# Patient Record
Sex: MALE | Race: AMERICAN INDIAN OR ALASKA NATIVE | NOT HISPANIC OR LATINO | Employment: FULL TIME | URBAN - METROPOLITAN AREA
[De-identification: names, ages, dates, MRNs, and addresses within clinical notes are randomized per-mention and may not be internally consistent; named-entity substitution may affect disease eponyms.]

---

## 2019-10-16 ENCOUNTER — OFFICE VISIT (OUTPATIENT)
Dept: FAMILY MEDICINE | Facility: OTHER | Age: 54
End: 2019-10-16
Payer: COMMERCIAL

## 2019-10-16 ENCOUNTER — ANCILLARY PROCEDURE (OUTPATIENT)
Dept: GENERAL RADIOLOGY | Facility: OTHER | Age: 54
End: 2019-10-16
Attending: PHYSICIAN ASSISTANT
Payer: COMMERCIAL

## 2019-10-16 VITALS
TEMPERATURE: 98.2 F | HEIGHT: 70 IN | HEART RATE: 84 BPM | WEIGHT: 207.8 LBS | DIASTOLIC BLOOD PRESSURE: 86 MMHG | OXYGEN SATURATION: 98 % | BODY MASS INDEX: 29.75 KG/M2 | RESPIRATION RATE: 16 BRPM | SYSTOLIC BLOOD PRESSURE: 158 MMHG

## 2019-10-16 DIAGNOSIS — M54.50 ACUTE BILATERAL LOW BACK PAIN WITHOUT SCIATICA: ICD-10-CM

## 2019-10-16 DIAGNOSIS — M54.2 CERVICAL PAIN (NECK): ICD-10-CM
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
## 2019-10-16 DIAGNOSIS — R03.0 ELEVATED BLOOD PRESSURE READING WITHOUT DIAGNOSIS OF HYPERTENSION: ICD-10-CM

## 2019-10-16 PROCEDURE — 72040 X-RAY EXAM NECK SPINE 2-3 VW: CPT

## 2019-10-16 PROCEDURE — 72100 X-RAY EXAM L-S SPINE 2/3 VWS: CPT

## 2019-10-16 PROCEDURE — 99204 OFFICE O/P NEW MOD 45 MIN: CPT | Performed by: PHYSICIAN ASSISTANT

## 2019-10-16 RX ORDER — CYCLOBENZAPRINE HCL 10 MG
10 TABLET ORAL 3 TIMES DAILY PRN
Qty: 30 TABLET | Refills: 1 | Status: SHIPPED | OUTPATIENT
Start: 2019-10-16 | End: 2019-12-11

## 2019-10-16 ASSESSMENT — MIFFLIN-ST. JEOR: SCORE: 1793.82
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
## 2019-10-16 NOTE — LETTER
[address]   [facility] [address]  Phone: [phone]    October 16, 2019        Alex Conklin  [address] [facility] [address]          To whom it may concern:    RE: Alex Conklin    Patient was seen and treated today at our clinic.  Please excuse from work 10/16/19 - 10/18/19    Please contact me for questions or concerns.      Sincerely,        Vishal Heller PA-C

## 2019-10-16 NOTE — PATIENT INSTRUCTIONS
Schedule with physical therapy to be seen.   Cyclobenzaprine take 1 tablet up to 3 times per day as needed but no driving or operating machinery on medication.   Tylenol/ibuprofen as needed for pain  See below for headache and concussion management.   Off work for Thursday and Friday.     Follow-up if not improving in 1 week, sooner if worse or new concerns.     RED FLAGS NEEDING ACUTE EMERGENCY MANAGEMENT:    Headaches that worsen    Seizures    Focal Neurologic Signs    Looks very drowsy/can't be awakened    Repeated vomiting    Slurred Speech    Can't recognize people/places    Increasing confusion/irritability    Weakness/numbness     Neck pain    Unusual behavioral change    Change in level of consciousness      What is a concussion? -- A concussion is a mild brain injury that commonly causes confusion, memory loss, and headache. Sometimes people pass out (lose consciousness) when they have a concussion, but not always.  A concussion can happen after a person is hit on the head, face, or neck, or their head or upper body is shaken too hard. Sports injuries, car accidents, and falls are common causes of concussions.  What are the symptoms of a concussion? -- Symptoms that can happen minutes to hours after a concussion include:  ?Memory loss - People sometimes forget what caused their injury, as well as what happened right before and after the injury.  ?Confusion  ?Headache  ?Dizziness or trouble with balance  ?Nausea or vomiting  ?Feeling sleepy  ?Acting cranky, strangely, or out of sorts     Symptoms that can happen hours to days after a concussion include:  ?Trouble walking or talking  ?Memory problems or problems paying attention  ?Trouble sleeping  ?Mood or behavior changes  ?Vision changes  ?Being bothered by noise or light    How is a concussion treated?  A concussion does not usually need treatment. Most concussions get better on their own, but it can take time. Some people's symptoms go away within  "minutes to hours. Other people have symptoms for weeks to months. When symptoms last a long time, doctors call it \"postconcussion syndrome.\"    To help your brain heal after a concussion, you can:   Very important to adhere to the following strictly for the first 3-5 days for best results.  And recommend that you be off work or school during that time.    ?Rest your body - Make sure to get plenty of sleep. When you are awake, you should avoid heavy exercise or too much physical activity.  ?Rest your brain - Avoid doing activities that need concentration or a lot of attention.  ?Not drink alcohol while you are still having symptoms of concussion  ?Take a pain-relieving medicine, if you have a headache - You can choose one with acetaminophen (sample brand name: Tylenol) or ibuprofen (sample brand names: Advil, Motrin).      (Information provided from Up To Date Concussion Patient Education Source)          "
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
## 2019-10-23 ENCOUNTER — OFFICE VISIT (OUTPATIENT)
Dept: FAMILY MEDICINE | Facility: OTHER | Age: 54
End: 2019-10-23
Payer: COMMERCIAL

## 2019-10-23 VITALS
OXYGEN SATURATION: 96 % | SYSTOLIC BLOOD PRESSURE: 130 MMHG | HEART RATE: 90 BPM | TEMPERATURE: 98.5 F | BODY MASS INDEX: 28.77 KG/M2 | DIASTOLIC BLOOD PRESSURE: 76 MMHG | HEIGHT: 70 IN | RESPIRATION RATE: 16 BRPM | WEIGHT: 201 LBS
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
## 2019-10-23 DIAGNOSIS — M54.50 ACUTE BILATERAL LOW BACK PAIN WITHOUT SCIATICA: ICD-10-CM

## 2019-10-23 DIAGNOSIS — G44.311 INTRACTABLE ACUTE POST-TRAUMATIC HEADACHE: ICD-10-CM

## 2019-10-23 DIAGNOSIS — M54.2 CERVICAL PAIN (NECK): ICD-10-CM

## 2019-10-23 PROCEDURE — 99214 OFFICE O/P EST MOD 30 MIN: CPT | Performed by: PHYSICIAN ASSISTANT

## 2019-10-23 ASSESSMENT — MIFFLIN-ST. JEOR: SCORE: 1760.04

## 2019-10-23 ASSESSMENT — PAIN SCALES - GENERAL: PAINLEVEL: EXTREME PAIN (8)

## 2019-10-23 NOTE — LETTER
[facility]  [address]   [facility] [address]  Phone: [phone]    October 23, 2019        Alex Conklin  [address]  [facility] [address]          To whom it may concern:    RE: Alex Conklin    Patient was seen and treated today at our clinic.  Concern for potential concussion.   Would recommend patient have off 10/23/2019 - 10/28/2019. If symptoms have improved ok to return to work on evening 10/28/2019 with restrictions for half shifts only for 3 days and if improving can return to regular shifts after this.  If not improving we will follow-up with patient.     Please contact me for questions or concerns.      Sincerely,        Vishal Heller PA-C

## 2019-10-23 NOTE — PATIENT INSTRUCTIONS
Headache:  - Get MRI done, if having vision changes, severe worsening headache go immediately to the ER.   - Ibuprofen 600-800 mg every 6-8 hours with food.   - Tylenol 500 mg every 4-6 hours additionally if needed.   - Cyclobenzaprine up to 3 times per day as needed but no driving on medication.   - Recommend seeing physical therapy. [phone].  - sleep and brain rest!  - if persistent headaches would like you to see Dr. Webb for concussion management.     Neck/Back:  - Schedule with physical therapy  - continue with plan discussed above.
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
## 2019-10-25 ENCOUNTER — HOSPITAL ENCOUNTER (OUTPATIENT)
Dept: MRI IMAGING | Facility: CLINIC | Age: 54
Discharge: HOME | End: 2019-10-25
Attending: PHYSICIAN ASSISTANT | Admitting: PHYSICIAN ASSISTANT
Payer: COMMERCIAL

## 2019-10-25 ENCOUNTER — HOSPITAL ENCOUNTER (OUTPATIENT)
Dept: GENERAL RADIOLOGY | Facility: CLINIC | Age: 54
End: 2019-10-25
Attending: PHYSICIAN ASSISTANT
Payer: COMMERCIAL

## 2019-10-25 DIAGNOSIS — G44.311 INTRACTABLE ACUTE POST-TRAUMATIC HEADACHE: ICD-10-CM

## 2019-10-25 PROCEDURE — 70551 MRI BRAIN STEM W/O DYE: CPT

## 2019-10-25 PROCEDURE — 70030 X-RAY EYE FOR FOREIGN BODY: CPT | Mod: TC

## 2019-10-30 ENCOUNTER — OFFICE VISIT (OUTPATIENT)
Dept: FAMILY MEDICINE | Facility: OTHER | Age: 54
End: 2019-10-30
Payer: COMMERCIAL

## 2019-10-30 VITALS
WEIGHT: 202 LBS | HEIGHT: 69 IN | OXYGEN SATURATION: 97 % | HEART RATE: 80 BPM | BODY MASS INDEX: 29.92 KG/M2 | RESPIRATION RATE: 14 BRPM | SYSTOLIC BLOOD PRESSURE: 166 MMHG | DIASTOLIC BLOOD PRESSURE: 104 MMHG | TEMPERATURE: 97.6 F

## 2019-10-30 DIAGNOSIS — G44.311 INTRACTABLE ACUTE POST-TRAUMATIC HEADACHE: Primary | ICD-10-CM

## 2019-10-30 PROCEDURE — 99213 OFFICE O/P EST LOW 20 MIN: CPT | Performed by: PHYSICIAN ASSISTANT

## 2019-10-30 RX ORDER — KETOROLAC TROMETHAMINE 10 MG/1
20 TABLET, FILM COATED ORAL ONCE
Qty: 4 TABLET | Refills: 0 | Status: SHIPPED | OUTPATIENT
Start: 2019-10-30 | End: 2019-12-11

## 2019-10-30 RX ORDER — AMITRIPTYLINE HYDROCHLORIDE 10 MG/1
10 TABLET ORAL AT BEDTIME
Qty: 30 TABLET | Refills: 0 | Status: SHIPPED | OUTPATIENT
Start: 2019-10-30 | End: 2019-11-06 | Stop reason: SINTOL

## 2019-10-30 RX ORDER — IBUPROFEN 600 MG/1
600 TABLET, FILM COATED ORAL EVERY 6 HOURS PRN
COMMUNITY
End: 2023-03-21

## 2019-10-30 ASSESSMENT — MIFFLIN-ST. JEOR: SCORE: 1761.26

## 2019-10-30 NOTE — LETTER
[facility]  [address]   [place] [address]  Phone: [phone]    October 30, 2019        Alex Conklin  [address]  [place] [address]          To whom it may concern:    RE: Alex Conklin    Patient was seen and treated today at our clinic.  We are assessing him and he is to be off until 100% per work.  He is not yet back to 100%.  We will notify as soon as possible when he is and when he can return to work.     Please contact me for questions or concerns.      Sincerely,        Vishal Heller PA-C

## 2019-10-30 NOTE — PATIENT INSTRUCTIONS
- Set up with Physical Therapy.   - Take the Toradol today, take with food in stomach.  Take 2 tablets once today. If needed can repeat again tomorrow.  No ibuprofen today or tomorrow if you're taking Toradol.   - Tylenol 500 mg every 4-6 hours if needed.   - Ice/heat on the neck. Neck massage.   - Cyclobenzaprine continue  - Amitriptyline take 1 tablet at bedtime, if tolerating can increase to 2 tablets after 3-4 days.   - drink lots of water, lots of rest.   - Avoid caffeine.     Follow-up 1 week for re-check.   Sooner if any concerns.

## 2019-10-31 ENCOUNTER — THERAPY VISIT (OUTPATIENT)
Dept: PHYSICAL THERAPY | Facility: CLINIC | Age: 54
End: 2019-10-31
Payer: COMMERCIAL

## 2019-10-31 DIAGNOSIS — M54.2 CERVICAL PAIN (NECK): ICD-10-CM

## 2019-10-31 DIAGNOSIS — M54.50 ACUTE BILATERAL LOW BACK PAIN WITHOUT SCIATICA: ICD-10-CM
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
## 2019-10-31 DIAGNOSIS — M54.2 CERVICAL PAIN: ICD-10-CM

## 2019-10-31 DIAGNOSIS — G44.209 TENSION HEADACHE: ICD-10-CM

## 2019-10-31 PROCEDURE — 97161 PT EVAL LOW COMPLEX 20 MIN: CPT | Mod: GP | Performed by: PHYSICAL THERAPIST

## 2019-10-31 PROCEDURE — 97140 MANUAL THERAPY 1/> REGIONS: CPT | Mod: GP | Performed by: PHYSICAL THERAPIST

## 2019-10-31 PROCEDURE — 97110 THERAPEUTIC EXERCISES: CPT | Mod: GP | Performed by: PHYSICAL THERAPIST
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
## 2019-11-05 ENCOUNTER — THERAPY VISIT (OUTPATIENT)
Dept: PHYSICAL THERAPY | Facility: CLINIC | Age: 54
End: 2019-11-05
Payer: COMMERCIAL

## 2019-11-05 DIAGNOSIS — G44.209 TENSION HEADACHE: ICD-10-CM

## 2019-11-05 DIAGNOSIS — M54.2 CERVICAL PAIN: ICD-10-CM

## 2019-11-05 PROCEDURE — 97140 MANUAL THERAPY 1/> REGIONS: CPT | Mod: GP | Performed by: PHYSICAL THERAPIST

## 2019-11-05 PROCEDURE — 97110 THERAPEUTIC EXERCISES: CPT | Mod: GP | Performed by: PHYSICAL THERAPIST

## 2019-11-06 ENCOUNTER — OFFICE VISIT (OUTPATIENT)
Dept: FAMILY MEDICINE | Facility: OTHER | Age: 54
End: 2019-11-06
Payer: COMMERCIAL

## 2019-11-06 VITALS
WEIGHT: 198.2 LBS | SYSTOLIC BLOOD PRESSURE: 144 MMHG | BODY MASS INDEX: 29.02 KG/M2 | RESPIRATION RATE: 16 BRPM | HEART RATE: 82 BPM | OXYGEN SATURATION: 99 % | DIASTOLIC BLOOD PRESSURE: 88 MMHG | TEMPERATURE: 97.3 F
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
## 2019-11-06 DIAGNOSIS — M54.50 ACUTE BILATERAL LOW BACK PAIN WITHOUT SCIATICA: ICD-10-CM

## 2019-11-06 DIAGNOSIS — G44.311 INTRACTABLE ACUTE POST-TRAUMATIC HEADACHE: Primary | ICD-10-CM

## 2019-11-06 DIAGNOSIS — M54.2 CERVICAL PAIN (NECK): ICD-10-CM

## 2019-11-06 PROCEDURE — 99213 OFFICE O/P EST LOW 20 MIN: CPT | Performed by: PHYSICIAN ASSISTANT

## 2019-11-06 RX ORDER — PROPRANOLOL HYDROCHLORIDE 20 MG/1
20 TABLET ORAL DAILY
Qty: 60 TABLET | Refills: 0 | Status: SHIPPED | OUTPATIENT
Start: 2019-11-06 | End: 2023-03-21

## 2019-11-06 ASSESSMENT — PAIN SCALES - GENERAL: PAINLEVEL: MODERATE PAIN (5)

## 2019-11-06 NOTE — PATIENT INSTRUCTIONS
Stop taking Amitriptyline  Ok to use cyclobenzaprine if needed.   Start Propranolol, take once daily.     See Neurology.  If having problems with the propranolol please let me know in the meantime, if it is helping let me know as well.

## 2019-11-12 ENCOUNTER — THERAPY VISIT (OUTPATIENT)
Dept: PHYSICAL THERAPY | Facility: CLINIC | Age: 54
End: 2019-11-12
Payer: COMMERCIAL

## 2019-11-12 ENCOUNTER — TELEPHONE (OUTPATIENT)
Dept: FAMILY MEDICINE | Facility: OTHER | Age: 54
End: 2019-11-12

## 2019-11-12 DIAGNOSIS — G44.209 TENSION HEADACHE: ICD-10-CM

## 2019-11-12 DIAGNOSIS — M54.2 CERVICAL PAIN: ICD-10-CM

## 2019-11-12 PROCEDURE — 97110 THERAPEUTIC EXERCISES: CPT | Mod: GP | Performed by: PHYSICAL THERAPIST

## 2019-11-12 PROCEDURE — 97140 MANUAL THERAPY 1/> REGIONS: CPT | Mod: GP | Performed by: PHYSICAL THERAPIST

## 2019-11-19 ENCOUNTER — THERAPY VISIT (OUTPATIENT)
Dept: PHYSICAL THERAPY | Facility: CLINIC | Age: 54
End: 2019-11-19
Payer: COMMERCIAL

## 2019-11-19 DIAGNOSIS — G44.209 TENSION HEADACHE: ICD-10-CM

## 2019-11-19 DIAGNOSIS — M54.2 CERVICAL PAIN: ICD-10-CM

## 2019-11-19 PROCEDURE — 97110 THERAPEUTIC EXERCISES: CPT | Mod: GP | Performed by: PHYSICAL THERAPIST

## 2019-11-19 PROCEDURE — 97140 MANUAL THERAPY 1/> REGIONS: CPT | Mod: GP | Performed by: PHYSICAL THERAPIST

## 2019-11-19 NOTE — PROGRESS NOTES
Subjective:  HPI                    Objective:  System    Physical Exam    General     ROS    Assessment/Plan:    DISCHARGE REPORT    Progress reporting period is from 10/31/19 to 11/19/19.       SUBJECTIVE  Subjective changes noted by patient:  .  Subjective: Pt reports the neck is not pain, mild pain remains. Primary pain with thiking. Pain today rated at 1-2/10 level. Pain remains in the suboccipitals. Headaches have been better, 1-2/10 level pain, getting up to 1 per day.     Current pain level is 1/10 Current Pain level: (1-2/10).     Previous pain level was  2/10 Initial Pain level: (5-11/10).   Changes in function:  Yes (See Goal flowsheet attached for changes in current functional level)  Adverse reaction to treatment or activity: None    OBJECTIVE  Changes noted in objective findings:    Objective: ROM flexion chin to chest no pain, extension to 73 deg, no pain, SB R to 33, no pain, L to 27, no pain, rotation R and L to 75 degrees. HEP going well no issues. Headaches are almost gone, pain mild at 1-2/10 when he gets them. Pt has met LTG, desires to continue on own. Will discharge PT     ASSESSMENT/PLAN  Updated problem list and treatment plan: Diagnosis 1:  Cervical pain   Pain -  home program  STG/LTGs have been met or progress has been made towards goals:  Yes (See Goal flow sheet completed today.)  Assessment of Progress: The patient has met all of their long term goals.  Self Management Plans:  Patient has been instructed in a home treatment program.  Patient is independent in a home treatment program.  Patient  has been instructed in self management of symptoms.  Patient is independent in self management of symptoms.    Alex continues to require the following intervention to meet STG and LTG's:  PT intervention is no longer required to meet STG/LTG.    Recommendations:  This patient is ready to be discharged from therapy and continue their home treatment program.    Please refer to the daily  flowsheet for treatment today, total treatment time and time spent performing 1:1 timed codes.

## 2019-11-27 DIAGNOSIS — G44.311 INTRACTABLE ACUTE POST-TRAUMATIC HEADACHE: ICD-10-CM

## 2019-11-27 NOTE — TELEPHONE ENCOUNTER
Pending Prescriptions:                       Disp   Refills    amitriptyline (ELAVIL) 10 MG tablet       30 tab*0            Sig: Take 1 tablet (10 mg) by mouth At Bedtime    Routing refill request to provider for review/approval because:  Drug not active on patient's medication list      Esperanza Benavides, RN, BSN

## 2019-11-28 RX ORDER — AMITRIPTYLINE HYDROCHLORIDE 10 MG/1
10 TABLET ORAL AT BEDTIME
Qty: 30 TABLET | Refills: 0 | Status: SHIPPED | OUTPATIENT
Start: 2019-11-28 | End: 2019-12-11

## 2019-12-10 ENCOUNTER — TRANSFERRED RECORDS (OUTPATIENT)
Dept: HEALTH INFORMATION MANAGEMENT | Facility: CLINIC | Age: 54
End: 2019-12-10

## 2019-12-11 ENCOUNTER — OFFICE VISIT (OUTPATIENT)
Dept: FAMILY MEDICINE | Facility: OTHER | Age: 54
End: 2019-12-11
Payer: COMMERCIAL

## 2019-12-11 VITALS
SYSTOLIC BLOOD PRESSURE: 130 MMHG | OXYGEN SATURATION: 98 % | HEIGHT: 70 IN | HEART RATE: 65 BPM | BODY MASS INDEX: 30.24 KG/M2 | RESPIRATION RATE: 16 BRPM | WEIGHT: 211.2 LBS | TEMPERATURE: 97.2 F | DIASTOLIC BLOOD PRESSURE: 84 MMHG

## 2019-12-11 DIAGNOSIS — G44.319 ACUTE POST-TRAUMATIC HEADACHE, NOT INTRACTABLE: Primary | ICD-10-CM
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
## 2019-12-11 PROCEDURE — 99213 OFFICE O/P EST LOW 20 MIN: CPT | Performed by: PHYSICIAN ASSISTANT

## 2019-12-11 ASSESSMENT — MIFFLIN-ST. JEOR: SCORE: 1813.63
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
## 2023-03-21 ENCOUNTER — OFFICE VISIT (OUTPATIENT)
Dept: FAMILY MEDICINE | Facility: OTHER | Age: 58
End: 2023-03-21
Payer: COMMERCIAL

## 2023-03-21 VITALS
DIASTOLIC BLOOD PRESSURE: 86 MMHG | SYSTOLIC BLOOD PRESSURE: 134 MMHG | TEMPERATURE: 97.1 F | HEIGHT: 69 IN | OXYGEN SATURATION: 98 % | RESPIRATION RATE: 18 BRPM | BODY MASS INDEX: 27.7 KG/M2 | HEART RATE: 76 BPM | WEIGHT: 187 LBS

## 2023-03-21 DIAGNOSIS — Z12.5 SCREENING FOR PROSTATE CANCER: ICD-10-CM

## 2023-03-21 DIAGNOSIS — Z00.00 ROUTINE HISTORY AND PHYSICAL EXAMINATION OF ADULT: Primary | ICD-10-CM

## 2023-03-21 DIAGNOSIS — E78.5 HYPERLIPIDEMIA LDL GOAL <130: ICD-10-CM

## 2023-03-21 DIAGNOSIS — Z12.11 SCREENING FOR COLON CANCER: ICD-10-CM

## 2023-03-21 DIAGNOSIS — L98.9 SKIN LESION: ICD-10-CM

## 2023-03-21 LAB
ALBUMIN SERPL BCG-MCNC: 4.4 G/DL (ref 3.5–5.2)
ALP SERPL-CCNC: 92 U/L (ref 40–129)
ALT SERPL W P-5'-P-CCNC: 32 U/L (ref 10–50)
ANION GAP SERPL CALCULATED.3IONS-SCNC: 8 MMOL/L (ref 7–15)
AST SERPL W P-5'-P-CCNC: 30 U/L (ref 10–50)
BILIRUB SERPL-MCNC: 0.4 MG/DL
BUN SERPL-MCNC: 18.3 MG/DL (ref 6–20)
CALCIUM SERPL-MCNC: 9.2 MG/DL (ref 8.6–10)
CHLORIDE SERPL-SCNC: 100 MMOL/L (ref 98–107)
CHOLEST SERPL-MCNC: 203 MG/DL
CREAT SERPL-MCNC: 0.94 MG/DL (ref 0.67–1.17)
DEPRECATED HCO3 PLAS-SCNC: 29 MMOL/L (ref 22–29)
ERYTHROCYTE [DISTWIDTH] IN BLOOD BY AUTOMATED COUNT: 13.3 % (ref 10–15)
GFR SERPL CREATININE-BSD FRML MDRD: >90 ML/MIN/1.73M2
GLUCOSE SERPL-MCNC: 117 MG/DL (ref 70–99)
HCT VFR BLD AUTO: 46.9 % (ref 40–53)
HDLC SERPL-MCNC: 50 MG/DL
HGB BLD-MCNC: 15.6 G/DL (ref 13.3–17.7)
LDLC SERPL CALC-MCNC: 133 MG/DL
MCH RBC QN AUTO: 30.8 PG (ref 26.5–33)
MCHC RBC AUTO-ENTMCNC: 33.3 G/DL (ref 31.5–36.5)
MCV RBC AUTO: 93 FL (ref 78–100)
NONHDLC SERPL-MCNC: 153 MG/DL
PLATELET # BLD AUTO: 200 10E3/UL (ref 150–450)
POTASSIUM SERPL-SCNC: 5 MMOL/L (ref 3.4–5.3)
PROT SERPL-MCNC: 7 G/DL (ref 6.4–8.3)
PSA SERPL DL<=0.01 NG/ML-MCNC: 1.28 NG/ML (ref 0–3.5)
RBC # BLD AUTO: 5.06 10E6/UL (ref 4.4–5.9)
SODIUM SERPL-SCNC: 137 MMOL/L (ref 136–145)
TRIGL SERPL-MCNC: 101 MG/DL
WBC # BLD AUTO: 8.1 10E3/UL (ref 4–11)

## 2023-03-21 PROCEDURE — 99213 OFFICE O/P EST LOW 20 MIN: CPT | Mod: 25 | Performed by: PHYSICIAN ASSISTANT

## 2023-03-21 PROCEDURE — 85027 COMPLETE CBC AUTOMATED: CPT | Performed by: PHYSICIAN ASSISTANT

## 2023-03-21 PROCEDURE — 80053 COMPREHEN METABOLIC PANEL: CPT | Performed by: PHYSICIAN ASSISTANT

## 2023-03-21 PROCEDURE — 99386 PREV VISIT NEW AGE 40-64: CPT | Performed by: PHYSICIAN ASSISTANT

## 2023-03-21 PROCEDURE — 36415 COLL VENOUS BLD VENIPUNCTURE: CPT | Performed by: PHYSICIAN ASSISTANT

## 2023-03-21 PROCEDURE — G0103 PSA SCREENING: HCPCS | Performed by: PHYSICIAN ASSISTANT

## 2023-03-21 PROCEDURE — 80061 LIPID PANEL: CPT | Performed by: PHYSICIAN ASSISTANT

## 2023-03-21 ASSESSMENT — ENCOUNTER SYMPTOMS
FREQUENCY: 0
NAUSEA: 0
DIZZINESS: 0
WEAKNESS: 0
HEMATOCHEZIA: 0
HEADACHES: 0
EYE PAIN: 0
CONSTIPATION: 0
NERVOUS/ANXIOUS: 1
SORE THROAT: 0
HEMATURIA: 0
PARESTHESIAS: 1
MYALGIAS: 0
SHORTNESS OF BREATH: 0
DYSURIA: 0
CHILLS: 0
COUGH: 0
FEVER: 0
PALPITATIONS: 0
JOINT SWELLING: 0
ARTHRALGIAS: 0
DIARRHEA: 0
ABDOMINAL PAIN: 0
HEARTBURN: 0

## 2023-03-21 ASSESSMENT — PAIN SCALES - GENERAL: PAINLEVEL: NO PAIN (0)

## 2023-03-21 NOTE — LETTER
March 23, 2023      Alex Conklin  [id number] [facility] [address]            Dear ,    Your complete blood cell count and prostate-specific antigen are within normal limits at this point in time.     Your cholesterol profile shows the need for increased diet and exercise to try to lower your LDL cholesterol below 100 to decrease your risk for cardiovascular disease and stroke. Your HDL is adequate at this point in time but diet and exercise will help raise that and keep it above 50 as a long-term goal as well. Repeat in 1 year.     Elevation in glucose can be significant for prediabetes and should be rechecked in 6 months in a strict fasting state. The remainder of your electrolytes, kidney and liver function are within normal limits at this point in time. If you are not strictly fasting at the time of your blood draw this can explain your glucose elevation as well.        Resulted Orders   PSA, screen   Result Value Ref Range    Prostate Specific Antigen Screen 1.28 0.00 - 3.50 ng/mL    Narrative    This result is obtained using the Roche Elecsys total PSA method on the [id number] immunoassay analyzer. Results obtained with different assay methods or kits cannot be used interchangeably.   Lipid panel reflex to direct LDL Fasting   Result Value Ref Range    Cholesterol 203 (H) <200 mg/dL    Triglycerides 101 <150 mg/dL    Direct Measure HDL 50 >=40 mg/dL    LDL Cholesterol Calculated 133 (H) <=100 mg/dL    Non HDL Cholesterol 153 (H) <130 mg/dL    Narrative    Cholesterol  Desirable:  <200 mg/dL    Triglycerides  Normal:  Less than 150 mg/dL  Borderline High:  150-199 mg/dL  High:  200-499 mg/dL  Very High:  Greater than or equal to 500 mg/dL    Direct Measure HDL  Female:  Greater than or equal to 50 mg/dL   Male:  Greater than or equal to 40 mg/dL    LDL Cholesterol  Desirable:  <100mg/dL  Above Desirable:  100-129 mg/dL   Borderline High:  130-159 mg/dL   High:  160-189 mg/dL   Very  High:  >= 190 mg/dL    Non HDL Cholesterol  Desirable:  130 mg/dL  Above Desirable:  130-159 mg/dL  Borderline High:  160-189 mg/dL  High:  190-219 mg/dL  Very High:  Greater than or equal to 220 mg/dL   Comprehensive metabolic panel (BMP + Alb, Alk Phos, ALT, AST, Total. Bili, TP)   Result Value Ref Range    Sodium 137 136 - 145 mmol/L    Potassium 5.0 3.4 - 5.3 mmol/L    Chloride 100 98 - 107 mmol/L    Carbon Dioxide (CO2) 29 22 - 29 mmol/L    Anion Gap 8 7 - 15 mmol/L    Urea Nitrogen 18.3 6.0 - 20.0 mg/dL    Creatinine 0.94 0.67 - 1.17 mg/dL    Calcium 9.2 8.6 - 10.0 mg/dL    Glucose 117 (H) 70 - 99 mg/dL    Alkaline Phosphatase 92 40 - 129 U/L    AST 30 10 - 50 U/L    ALT 32 10 - 50 U/L    Protein Total 7.0 6.4 - 8.3 g/dL    Albumin 4.4 3.5 - 5.2 g/dL    Bilirubin Total 0.4 <=1.2 mg/dL    GFR Estimate >90 >60 mL/min/1.73m2      Comment:      eGFR calculated using 2021 CKD-EPI equation.   CBC with platelets   Result Value Ref Range    WBC Count 8.1 4.0 - 11.0 10e3/uL    RBC Count 5.06 4.40 - 5.90 10e6/uL    Hemoglobin 15.6 13.3 - 17.7 g/dL    Hematocrit 46.9 40.0 - 53.0 %    MCV 93 78 - 100 fL    MCH 30.8 26.5 - 33.0 pg    MCHC 33.3 31.5 - 36.5 g/dL    RDW 13.3 10.0 - 15.0 %    Platelet Count 200 150 - 450 10e3/uL       If you have any questions or concerns, please call the clinic at the number listed above.     Sincerely,    Delvis Heath PA-C

## 2023-03-21 NOTE — PROGRESS NOTES
SUBJECTIVE:   CC: Alex is an 57 year old who presents for preventative health visit.     Patient has been advised of split billing requirements and indicates understanding: Yes     Healthy Habits:     Getting at least 3 servings of Calcium per day:  Yes    Bi-annual eye exam:  Yes    Dental care twice a year:  NO    Sleep apnea or symptoms of sleep apnea:  None    Diet:  Regular (no restrictions)    Frequency of exercise:  1 day/week    Duration of exercise:  Less than 15 minutes    Taking medications regularly:  Yes    Medication side effects:  None    PHQ-2 Total Score: 0    Additional concerns today:  Yes (Moles and stomach mass)    Today's PHQ-2 Score:   PHQ-2 ( 1999 Pfizer) 3/21/2023   Q1: Little interest or pleasure in doing things 0   Q2: Feeling down, depressed or hopeless 0   PHQ-2 Score 0   Q1: Little interest or pleasure in doing things Not at all   Q2: Feeling down, depressed or hopeless Not at all   PHQ-2 Score 0       Social History     Tobacco Use     Smoking status: Every Day     Packs/day: 1.00     Types: Cigarettes     Smokeless tobacco: Never   Substance Use Topics     Alcohol use: Yes         Alcohol Use 3/21/2023   Prescreen: >3 drinks/day or >7 drinks/week? Yes   AUDIT SCORE  7       Last PSA: No results found for: PSA    Reviewed orders with patient. Reviewed health maintenance and updated orders accordingly - Yes  Lab work is in process  Labs reviewed in EPIC  BP Readings from Last 3 Encounters:   03/21/23 134/86   12/11/19 130/84   11/06/19 (!) 144/88    Wt Readings from Last 3 Encounters:   03/21/23 84.8 kg (187 lb)   12/11/19 95.8 kg (211 lb 3.2 oz)   11/06/19 89.9 kg (198 lb 3.2 oz)                  Patient Active Problem List   Diagnosis     Hyperlipidemia LDL goal <130     Skin lesion - multiple to the back     History reviewed. No pertinent surgical history.    Social History     Tobacco Use     Smoking status: Every Day     Packs/day: 1.00     Types: Cigarettes     Smokeless  "tobacco: Never   Substance Use Topics     Alcohol use: Yes     Family History   Problem Relation Age of Onset     Diabetes Mother      Diabetes Brother          No current outpatient medications on file.     No Known Allergies  No lab results found.     Reviewed and updated as needed this visit by clinical staff   Tobacco  Allergies  Meds  Problems  Med Hx  Surg Hx  Fam Hx          Reviewed and updated as needed this visit by Provider                 Past Medical History:   Diagnosis Date     Hyperlipidemia LDL goal <130 3/21/2023      History reviewed. No pertinent surgical history.    Review of Systems  CONSTITUTIONAL: NEGATIVE for fever, chills, change in weight  INTEGUMENTARY/SKIN: NEGATIVE for worrisome rashes, moles or lesions  EYES: NEGATIVE for vision changes or irritation  ENT: NEGATIVE for ear, mouth and throat problems  RESP: NEGATIVE for significant cough or SOB  CV: NEGATIVE for chest pain, palpitations or peripheral edema  GI: NEGATIVE for nausea, abdominal pain, heartburn, or change in bowel habits   male: negative for dysuria, hematuria, decreased urinary stream, erectile dysfunction, urethral discharge  MUSCULOSKELETAL: NEGATIVE for significant arthralgias or myalgia  NEURO: NEGATIVE for weakness, dizziness or paresthesias  PSYCHIATRIC: NEGATIVE for changes in mood or affect    OBJECTIVE:   /86   Pulse 76   Temp 97.1  F (36.2  C) (Temporal)   Resp 18   Ht 1.745 m (5' 8.7\")   Wt 84.8 kg (187 lb)   SpO2 98%   BMI 27.86 kg/m      Physical Exam  GENERAL: healthy, alert and no distress  EYES: Eyes grossly normal to inspection, PERRL and conjunctivae and sclerae normal  HENT: ear canals and TM's normal, nose and mouth without ulcers or lesions  NECK: no adenopathy, no asymmetry, masses, or scars and thyroid normal to palpation  RESP: lungs clear to auscultation - no rales, rhonchi or wheezes  CV: regular rate and rhythm, normal S1 S2, no S3 or S4, no murmur, click or rub, no " peripheral edema and peripheral pulses strong  ABDOMEN: soft, nontender, no hepatosplenomegaly, no masses and bowel sounds normal  MS: no gross musculoskeletal defects noted, no edema  SKIN: no suspicious lesions or rashes, though he does have multiple melanotic lesions to the back.  There are 3 of them for sure that I would remove in the near future for a representative sample and if abnormal he needs to see a dermatologist.  The largest one is approximately 12 mm in rough diameter and appears to be a seborrheic keratosis type lesion that is deeply melanotic.  He has 2 lesions inferior to this both that are about 4 mm in rough diameter that would be taken for a representative sample as well.  He has a skin tag to the lateral aspect of the left I in the inferior eyelid region.  We may be able to submit this to cryotherapy and have it fall off on its own.  NEURO: Normal strength and tone, mentation intact and speech normal  PSYCH: mentation appears normal, affect normal/bright    Diagnostic Test Results:  Labs reviewed in Epic  No results found for any visits on 03/21/23.    ASSESSMENT/PLAN:   Alex was seen today for physical.    Diagnoses and all orders for this visit:    Routine history and physical examination of adult  -     CBC with platelets; Future  -     Comprehensive metabolic panel (BMP + Alb, Alk Phos, ALT, AST, Total. Bili, TP); Future  -     Lipid panel reflex to direct LDL Fasting; Future  -     Lipid panel reflex to direct LDL Fasting  -     Comprehensive metabolic panel (BMP + Alb, Alk Phos, ALT, AST, Total. Bili, TP)  -     CBC with platelets    Hyperlipidemia LDL goal <130    Skin lesion - multiple to the back    Screening for prostate cancer  -     PSA, screen; Future  -     PSA, screen    Screening for colon cancer  -     Fecal colorectal cancer screen (FIT); Future  -     Fecal colorectal cancer screen (FIT)    57-year-old male here for routine physical.  Would have him repeat this in 1  year.    LDL goal discussed.  Evaluation related to labs is pending at time of dictation.  For ultimate goal should be less than 100 but at this point time we will start with 130 and see how he is doing.    Multiple skin lesions to the back are noted.  These are deeply melanotic.  Would have him present as the last patient my morning the last patient of my afternoon for removal of representative sample.  Follow-up with dermatology is encouraged but he would like to have a few removed and sent in first.  Skin tag to the lateral left eye lid on the lower aspect is noted.  May need to submit this to cryotherapy.    Patient has been advised of split billing requirements and indicates understanding: Yes      COUNSELING:   Reviewed preventive health counseling, as reflected in patient instructions        He reports that he has been smoking cigarettes. He has been smoking an average of 1 pack per day. He has never used smokeless tobacco.  Nicotine/Tobacco Cessation Plan:   Information offered: Patient not interested at this time    Delvis Sahu PA-C  [facility]

## 2023-04-06 ENCOUNTER — TELEPHONE (OUTPATIENT)
Dept: FAMILY MEDICINE | Facility: OTHER | Age: 58
End: 2023-04-06
Payer: COMMERCIAL

## 2023-04-06 NOTE — TELEPHONE ENCOUNTER
If patient returns call please move his appointment to 510 arrival time. He needs to be the last patient of the morning or the last patient of the evening.     Dorina Jarrell MA

## 2023-04-13 ENCOUNTER — OFFICE VISIT (OUTPATIENT)
Dept: FAMILY MEDICINE | Facility: OTHER | Age: 58
End: 2023-04-13
Payer: COMMERCIAL

## 2023-04-13 VITALS
SYSTOLIC BLOOD PRESSURE: 150 MMHG | RESPIRATION RATE: 18 BRPM | HEIGHT: 69 IN | WEIGHT: 184 LBS | DIASTOLIC BLOOD PRESSURE: 70 MMHG | TEMPERATURE: 98 F | BODY MASS INDEX: 27.25 KG/M2 | OXYGEN SATURATION: 96 % | HEART RATE: 70 BPM

## 2023-04-13 DIAGNOSIS — L98.9 SKIN LESION: Primary | ICD-10-CM

## 2023-04-13 PROCEDURE — 88305 TISSUE EXAM BY PATHOLOGIST: CPT | Performed by: PATHOLOGY

## 2023-04-13 PROCEDURE — 11402 EXC TR-EXT B9+MARG 1.1-2 CM: CPT | Performed by: PHYSICIAN ASSISTANT

## 2023-04-13 ASSESSMENT — PAIN SCALES - GENERAL: PAINLEVEL: NO PAIN (0)

## 2023-04-13 NOTE — LETTER
"April 17, 2023      Alex Conklin  [id number] [facility] [address]        Dear ,    We are writing to inform you of your test results.    Your test results fall within the expected range(s) or remain unchanged from previous results.  Please continue with current treatment plan.    Resulted Orders   Surgical Pathology Exam   Result Value Ref Range    Case Report       Surgical Pathology Report                         Case: [id number]                                  Authorizing Provider:  Delvis Heath PA-C        Collected:           04/13/2023 05:44 PM          Ordering Location:     [facility]   Received:            04/13/2023 05:48 PM                                 [facility]                                                                    Pathologist:           Gala Garcia MD PhD                                                      Specimens:   A) - Back, Lower, mid                                                                               B) - Back, Upper, MID                                                                               C) - Back, Lower                                                                           Final Diagnosis       A(A).  Skin, lower middle back, excision:  -Seborrheic keratosis  -Negative for atypia or malignancy    B(A).  Skin, upper middle back, excision:  -Seborrheic keratosis  -Negative for atypia or malignancy    C(A).  Skin, lower back, excision:  -Seborrheic keratosis  -Negative for atypia or malignancy        Clinical Information       Lesion changing in nature, darker        Gross Description       A(A). Back, Lower, mid:  The specimen is received in formalin, labeled with the patient's name, medical record number and other identifying information designated \"mid lower back skin lesion\". It consists of tan irregular shaped shave skin biopsy (0.9 x 0.8 x 0.2 cm) with brown friable rough-surfaced elevated lesion (0.5 " "x 0.5 x 0.2 cm).  The specimen is inked black and trisected.  The specimen is entirely submitted in 1 cassette.    B(A). Back, Upper, MID:  The specimen is received in formalin, labeled with the patient's name, medical record number and other identifying information designated \"upper mid back skin\". It consists of a tan irregular shaped shave skin biopsy (1.5 x 1.3 x 0.2 centimeters) with gray-brown rough-surfaced elevated lesion (1.1 x 0.8 x 0.3 cm).  The specimen is inked black and serially sectioned.  The specimen is entirely submitted in 1 cassette.    C(A). Back, Lower, :  The specimen is received in formalin, labeled with the patient's name, medical record number and other identifying information designated \"lower back skin\". It consists of a tan irregular shaped shave skin biopsy (0.8 x 0.7 x 0.1 cm) with brown rough surface elevated lesion (0.5 x 0.4 x 0.2 cm).  The specimen is inked blue and trisected.  The specimens entirely submitted in 1 cassette.    ([facility])      Microscopic Description       Microscopic examination was performed.      Performing Labs       The technical component of this testing was completed at [facility]        Case Images     Surgical Pathology Exam   Result Value Ref Range    Case Report       Surgical Pathology Report                         Case: [id number]                                  Authorizing Provider:  Delvis Heath PA-C        Collected:           04/13/2023 05:44 PM          Ordering Location:     [facility]   Received:            04/13/2023 05:48 PM                                 [place]                                                                    Pathologist:           Gala Garcia MD PhD                                                      Specimens:   A) - Back, Lower, mid                                                                               B) - Back, " "Upper, MID                                                                               C) - Back, Lower                                                                           Final Diagnosis       A(A).  Skin, lower middle back, excision:  -Seborrheic keratosis  -Negative for atypia or malignancy    B(A).  Skin, upper middle back, excision:  -Seborrheic keratosis  -Negative for atypia or malignancy    C(A).  Skin, lower back, excision:  -Seborrheic keratosis  -Negative for atypia or malignancy        Clinical Information       Lesion changing in nature, darker        Gross Description       A(A). Back, Lower, mid:  The specimen is received in formalin, labeled with the patient's name, medical record number and other identifying information designated \"mid lower back skin lesion\". It consists of tan irregular shaped shave skin biopsy (0.9 x 0.8 x 0.2 cm) with brown friable rough-surfaced elevated lesion (0.5 x 0.5 x 0.2 cm).  The specimen is inked black and trisected.  The specimen is entirely submitted in 1 cassette.    B(A). Back, Upper, MID:  The specimen is received in formalin, labeled with the patient's name, medical record number and other identifying information designated \"upper mid back skin\". It consists of a tan irregular shaped shave skin biopsy (1.5 x 1.3 x 0.2 centimeters) with gray-brown rough-surfaced elevated lesion (1.1 x 0.8 x 0.3 cm).  The specimen is inked black and serially sectioned.  The specimen is entirely submitted in 1 cassette.    C(A). Back, Lower, :  The specimen is received in formalin, labeled with the patient's name, medical record number and other identifying information designated \"lower back skin\". It consists of a tan irregular shaped shave skin biopsy (0.8 x 0.7 x 0.1 cm) with brown rough surface elevated lesion (0.5 x 0.4 x 0.2 cm).  The specimen is inked blue and trisected.  The specimens entirely submitted in 1 cassette.    ([facility])      Microscopic " "Description       Microscopic examination was performed.      Performing Labs       The technical component of this testing was completed at [facility]        Case Images     Surgical Pathology Exam   Result Value Ref Range    Case Report       Surgical Pathology Report                         Case: [id number]                                  Authorizing Provider:  Delvis Heath PA-C        Collected:           04/13/2023 05:44 PM          Ordering Location:     [facility]   Received:            04/13/2023 05:48 PM                                 [place]                                                                    Pathologist:           Gala Garcia MD PhD                                                      Specimens:   A) - Back, Lower, mid                                                                               B) - Back, Upper, MID                                                                               C) - Back, Lower                                                                           Final Diagnosis       A(A).  Skin, lower middle back, excision:  -Seborrheic keratosis  -Negative for atypia or malignancy    B(A).  Skin, upper middle back, excision:  -Seborrheic keratosis  -Negative for atypia or malignancy    C(A).  Skin, lower back, excision:  -Seborrheic keratosis  -Negative for atypia or malignancy        Clinical Information       Lesion changing in nature, darker        Gross Description       A(A). Back, Lower, mid:  The specimen is received in formalin, labeled with the patient's name, medical record number and other identifying information designated \"mid lower back skin lesion\". It consists of tan irregular shaped shave skin biopsy (0.9 x 0.8 x 0.2 cm) with brown friable rough-surfaced elevated lesion (0.5 x 0.5 x 0.2 cm).  The specimen is inked black and trisected.  The specimen is entirely " "submitted in 1 cassette.    B(A). Back, Upper, MID:  The specimen is received in formalin, labeled with the patient's name, medical record number and other identifying information designated \"upper mid back skin\". It consists of a tan irregular shaped shave skin biopsy (1.5 x 1.3 x 0.2 centimeters) with gray-brown rough-surfaced elevated lesion (1.1 x 0.8 x 0.3 cm).  The specimen is inked black and serially sectioned.  The specimen is entirely submitted in 1 cassette.    C(A). Back, Lower, :  The specimen is received in formalin, labeled with the patient's name, medical record number and other identifying information designated \"lower back skin\". It consists of a tan irregular shaped shave skin biopsy (0.8 x 0.7 x 0.1 cm) with brown rough surface elevated lesion (0.5 x 0.4 x 0.2 cm).  The specimen is inked blue and trisected.  The specimens entirely submitted in 1 cassette.    ([facility])      Microscopic Description       Microscopic examination was performed.      Performing Labs       The technical component of this testing was completed at [facility]        Case Images       All 3 lesions are benign at this point in time.  Follow-up as needed.         Delvis Sahu PA-C             "

## 2023-04-13 NOTE — PROGRESS NOTES
"  Assessment & Plan     Skin lesion  Multiple lesions to the back and anterior thorax that are fairly similar in nature.  The largest one is approximately 12 mm in rough diameter and appears to be a seborrheic keratosis type lesion that is deeply melanotic.  This is to the mid upper back at about the level of T4 along the midline.  He has 2 lesions inferior to this both that are about 4 mm in rough diameter that would be taken for a representative sample as well.  Both of these are in the area between L1 and L3 and on the midline as well.  The 3 melanotic lesions are subjected to biopsy today.   He has a skin tag to the lateral aspect of the left I in the inferior eyelid region.  He also has 2 areas to the right cheek that he would like to have addressed.  These are treated with cautery. We may be able to submit this to cryotherapy and have it fall off on its own.  - Surgical Pathology Exam  - Surgical Pathology Exam  - Surgical Pathology Exam      BMI:   Estimated body mass index is 27.44 kg/m  as calculated from the following:    Height as of this encounter: 1.744 m (5' 8.66\").    Weight as of this encounter: 83.5 kg (184 lb).     Delvis Sahu PA-C  [facility] 57 year [facility], presenting for the following health issues:  Lesion Removal (On back )        4/13/2023     5:21 PM   Additional Questions   Roomed by Burton     History of Present Illness       Reason for visit:  Mole    He eats 0-1 servings of fruits and vegetables daily.He consumes 1 sweetened beverage(s) daily.He exercises with enough effort to increase his heart rate 10 to 19 minutes per day.  He exercises with enough effort to increase his heart rate 3 or less days per week.   He is taking medications regularly.         Review of Systems   Constitutional, HEENT, cardiovascular, pulmonary, GI, , musculoskeletal, neuro, skin, endocrine and psych systems are negative, except as otherwise noted.    " "  Objective    BP (!) 142/70   Pulse 70   Temp 98  F (36.7  C) (Temporal)   Resp 18   Ht 1.744 m (5' 8.66\")   Wt 83.5 kg (184 lb)   SpO2 96%   BMI 27.44 kg/m    Body mass index is 27.44 kg/m .  Physical Exam   GENERAL: healthy, alert and no distress  RESP: lungs clear to auscultation - no rales, rhonchi or wheezes  CV: regular rate and rhythm, normal S1 S2, no S3 or S4, no murmur, click or rub, no peripheral edema and peripheral pulses strong  MS: no gross musculoskeletal defects noted, no edema  SKIN: no suspicious lesions or rashes other than those listed above.    Procedure:  Informed consent is obtained and risk factors discussed to the patients satisfaction.  I answered questions the patient had.  The area of concern is/are cleansed with betadine 3x's.  The base of the lesion(s) is / are infiltrated with 2% xylocaine/epinephrine local with good effect.  The lesion(s) are removed with sharp shave type dissection and placed in pathology containers which have been labeled appropriately.  Cautery is used to obtain hemostasis.  Surgical sites are cleansed and dressed in the usual fashion.  Patient tolerated the procedure well.                "

## 2023-04-17 LAB
PATH REPORT.COMMENTS IMP SPEC: NORMAL
PATH REPORT.FINAL DX SPEC: NORMAL
PATH REPORT.GROSS SPEC: NORMAL
PATH REPORT.MICROSCOPIC SPEC OTHER STN: NORMAL
PATH REPORT.RELEVANT HX SPEC: NORMAL
PHOTO IMAGE: NORMAL